# Patient Record
Sex: FEMALE | URBAN - METROPOLITAN AREA
[De-identification: names, ages, dates, MRNs, and addresses within clinical notes are randomized per-mention and may not be internally consistent; named-entity substitution may affect disease eponyms.]

---

## 2022-06-15 ENCOUNTER — OFFICE VISIT (OUTPATIENT)
Dept: URBAN - METROPOLITAN AREA CLINIC 91 | Facility: CLINIC | Age: 52
End: 2022-06-15
Payer: COMMERCIAL

## 2022-06-15 DIAGNOSIS — H40.013 OPEN ANGLE WITH BORDERLINE FINDINGS, LOW RISK, BILATERAL: Primary | ICD-10-CM

## 2022-06-15 DIAGNOSIS — H16.223 BILATERAL KERATOCONJUNCTIVITIS SICCA, NOT SPECIFIED AS SJOGREN'S: ICD-10-CM

## 2022-06-15 PROCEDURE — 92133 CPTRZD OPH DX IMG PST SGM ON: CPT | Performed by: SPECIALIST

## 2022-06-15 PROCEDURE — 99204 OFFICE O/P NEW MOD 45 MIN: CPT | Performed by: SPECIALIST

## 2022-06-15 ASSESSMENT — INTRAOCULAR PRESSURE
OS: 22
OD: 23

## 2022-06-15 NOTE — IMPRESSION/PLAN
Impression: Bilateral keratoconjunctivitis sicca, not specified as Sjogren's: A95.743. Plan: For severe KCS, continue using Restasis BID OU. Due to severity of dry eyes,  decreased tear break up time, and no relief with artificial tears, patient will benefit from use of Restasis to relieve severe dry eyes. Will consider Lipiflow treatment in the future.

## 2022-06-15 NOTE — IMPRESSION/PLAN
Impression: Open angle with borderline findings, low risk, bilateral: H40.013. Plan: Due to large cups and elevated IOP's explained glaucoma suspect. I have recommended patient have baseline glaucoma testing including visual field, tomography, fundus photo, pachymetry and gonioscopy. I have also stressed the importance of close monitoring, and patient compliance with follow-up appointments. The risk of blindness, loss of vision and need for possible surgery was explained in detail to patient.

## 2022-08-02 ENCOUNTER — OFFICE VISIT (OUTPATIENT)
Dept: URBAN - METROPOLITAN AREA CLINIC 91 | Facility: CLINIC | Age: 52
End: 2022-08-02
Payer: COMMERCIAL

## 2022-08-02 DIAGNOSIS — H25.093 OTHER AGE-RELATED INCIPIENT CATARACT, BILATERAL: ICD-10-CM

## 2022-08-02 DIAGNOSIS — H16.223 BILATERAL KERATOCONJUNCTIVITIS SICCA, NOT SPECIFIED AS SJOGREN'S: ICD-10-CM

## 2022-08-02 DIAGNOSIS — H40.013 OPEN ANGLE WITH BORDERLINE FINDINGS, LOW RISK, BILATERAL: Primary | ICD-10-CM

## 2022-08-02 PROCEDURE — 92083 EXTENDED VISUAL FIELD XM: CPT | Performed by: SPECIALIST

## 2022-08-02 PROCEDURE — 76514 ECHO EXAM OF EYE THICKNESS: CPT | Performed by: SPECIALIST

## 2022-08-02 PROCEDURE — 99214 OFFICE O/P EST MOD 30 MIN: CPT | Performed by: SPECIALIST

## 2022-08-02 ASSESSMENT — INTRAOCULAR PRESSURE
OS: 18
OD: 18

## 2022-08-02 NOTE — IMPRESSION/PLAN
Impression: Bilateral keratoconjunctivitis sicca, not specified as Sjogren's: X12.406. Plan: For severe KCS, I have recommended Dewaine Oven for the patient. Due to severity of dry eyes,  decreased tear break up time, and no relief with artificial tears, patient will benefit from use of  Xiidra to relieve severe dry eyes.

## 2022-08-02 NOTE — IMPRESSION/PLAN
Impression: Open angle with borderline findings, low risk, bilateral: H40.013. Plan: VF and Pach are both stable comapred to past OV's. No gtst required IOP stable OU compared to past OV's Due to large cups and elevated IOP's explained glaucoma suspect. I have recommended patient have baseline glaucoma testing including visual field, tomography, fundus photo, pachymetry and gonioscopy. I have also stressed the importance of close monitoring, and patient compliance with follow-up appointments. The risk of blindness, loss of vision and need for possible surgery was explained in detail to patient.

## 2023-12-11 ENCOUNTER — OFFICE VISIT (OUTPATIENT)
Facility: LOCATION | Age: 53
End: 2023-12-11
Payer: COMMERCIAL

## 2023-12-11 DIAGNOSIS — H16.223 KERATOCONJUNCT SICCA, NOT SPECIFIED AS SJOGREN'S, BILATERAL: ICD-10-CM

## 2023-12-11 DIAGNOSIS — D23.111: ICD-10-CM

## 2023-12-11 DIAGNOSIS — H04.123 DRY EYE SYNDROME OF BILATERAL LACRIMAL GLANDS: Primary | ICD-10-CM

## 2023-12-11 PROCEDURE — 99213 OFFICE O/P EST LOW 20 MIN: CPT | Performed by: OPHTHALMOLOGY

## 2023-12-11 RX ORDER — LIFITEGRAST 50 MG/ML
5 % SOLUTION/ DROPS OPHTHALMIC
Qty: 90 | Refills: 3 | Status: ACTIVE
Start: 2023-12-11

## 2023-12-11 ASSESSMENT — INTRAOCULAR PRESSURE
OD: 19
OS: 18

## 2023-12-11 ASSESSMENT — VISUAL ACUITY
OD: 20/25
OS: 20/20

## 2024-12-11 ENCOUNTER — OFFICE VISIT (OUTPATIENT)
Facility: LOCATION | Age: 54
End: 2024-12-11
Payer: COMMERCIAL

## 2024-12-11 DIAGNOSIS — H16.223 KERATOCONJUNCT SICCA, NOT SPECIFIED AS SJOGREN'S, BILATERAL: ICD-10-CM

## 2024-12-11 DIAGNOSIS — H04.123 DRY EYE SYNDROME OF BILATERAL LACRIMAL GLANDS: Primary | ICD-10-CM

## 2024-12-11 DIAGNOSIS — H40.013 OPEN ANGLE WITH BORDERLINE FINDINGS, LOW RISK, BILATERAL: ICD-10-CM

## 2024-12-11 PROCEDURE — 99214 OFFICE O/P EST MOD 30 MIN: CPT | Performed by: STUDENT IN AN ORGANIZED HEALTH CARE EDUCATION/TRAINING PROGRAM

## 2024-12-11 RX ORDER — LIFITEGRAST 50 MG/ML
5 % SOLUTION/ DROPS OPHTHALMIC
Qty: 60 | Refills: 11 | Status: ACTIVE
Start: 2024-12-11

## 2024-12-11 ASSESSMENT — INTRAOCULAR PRESSURE
OD: 17
OS: 17

## 2024-12-11 ASSESSMENT — VISUAL ACUITY
OD: 20/20
OS: 20/25